# Patient Record
Sex: FEMALE | Race: WHITE | URBAN - METROPOLITAN AREA
[De-identification: names, ages, dates, MRNs, and addresses within clinical notes are randomized per-mention and may not be internally consistent; named-entity substitution may affect disease eponyms.]

---

## 2017-01-17 ENCOUNTER — HOSPITAL ENCOUNTER (EMERGENCY)
Facility: CLINIC | Age: 57
Discharge: HOME | End: 2017-01-18
Attending: EMERGENCY MEDICINE | Admitting: EMERGENCY MEDICINE
Payer: COMMERCIAL

## 2017-01-17 DIAGNOSIS — G44.219 EPISODIC TENSION-TYPE HEADACHE, NOT INTRACTABLE: ICD-10-CM

## 2017-01-17 LAB
ANION GAP SERPL CALCULATED.3IONS-SCNC: 11 MMOL/L (ref 3–14)
BASOPHILS # BLD AUTO: 0.1 10E9/L (ref 0–0.2)
BASOPHILS NFR BLD AUTO: 1.2 %
BUN SERPL-MCNC: 14 MG/DL (ref 7–30)
CALCIUM SERPL-MCNC: 9.3 MG/DL (ref 8.5–10.1)
CHLORIDE SERPL-SCNC: 102 MMOL/L (ref 94–109)
CO2 SERPL-SCNC: 27 MMOL/L (ref 20–32)
CREAT SERPL-MCNC: 0.91 MG/DL (ref 0.52–1.04)
DIFFERENTIAL METHOD BLD: NORMAL
EOSINOPHIL # BLD AUTO: 0.1 10E9/L (ref 0–0.7)
EOSINOPHIL NFR BLD AUTO: 2.1 %
ERYTHROCYTE [DISTWIDTH] IN BLOOD BY AUTOMATED COUNT: 11.4 % (ref 10–15)
GFR SERPL CREATININE-BSD FRML MDRD: 64 ML/MIN/1.7M2
GLUCOSE SERPL-MCNC: 112 MG/DL (ref 70–99)
HCT VFR BLD AUTO: 39.2 % (ref 35–47)
HGB BLD-MCNC: 13.7 G/DL (ref 11.7–15.7)
IMM GRANULOCYTES # BLD: 0 10E9/L (ref 0–0.4)
IMM GRANULOCYTES NFR BLD: 0.2 %
LYMPHOCYTES # BLD AUTO: 2.2 10E9/L (ref 0.8–5.3)
LYMPHOCYTES NFR BLD AUTO: 44.1 %
MCH RBC QN AUTO: 30.4 PG (ref 26.5–33)
MCHC RBC AUTO-ENTMCNC: 34.9 G/DL (ref 31.5–36.5)
MCV RBC AUTO: 87 FL (ref 78–100)
MONOCYTES # BLD AUTO: 0.4 10E9/L (ref 0–1.3)
MONOCYTES NFR BLD AUTO: 7.6 %
NEUTROPHILS # BLD AUTO: 2.2 10E9/L (ref 1.6–8.3)
NEUTROPHILS NFR BLD AUTO: 44.8 %
NRBC # BLD AUTO: 0 10*3/UL
NRBC BLD AUTO-RTO: 0 /100
PLATELET # BLD AUTO: 208 10E9/L (ref 150–450)
POTASSIUM SERPL-SCNC: 3.1 MMOL/L (ref 3.4–5.3)
RBC # BLD AUTO: 4.5 10E12/L (ref 3.8–5.2)
SODIUM SERPL-SCNC: 140 MMOL/L (ref 133–144)
TROPONIN I SERPL-MCNC: NORMAL UG/L (ref 0–0.04)
WBC # BLD AUTO: 4.9 10E9/L (ref 4–11)

## 2017-01-17 PROCEDURE — 80048 BASIC METABOLIC PNL TOTAL CA: CPT | Performed by: EMERGENCY MEDICINE

## 2017-01-17 PROCEDURE — 96361 HYDRATE IV INFUSION ADD-ON: CPT

## 2017-01-17 PROCEDURE — 25000128 H RX IP 250 OP 636: Performed by: EMERGENCY MEDICINE

## 2017-01-17 PROCEDURE — 93005 ELECTROCARDIOGRAM TRACING: CPT

## 2017-01-17 PROCEDURE — 85025 COMPLETE CBC W/AUTO DIFF WBC: CPT | Performed by: EMERGENCY MEDICINE

## 2017-01-17 PROCEDURE — 84484 ASSAY OF TROPONIN QUANT: CPT | Performed by: EMERGENCY MEDICINE

## 2017-01-17 PROCEDURE — 96375 TX/PRO/DX INJ NEW DRUG ADDON: CPT

## 2017-01-17 PROCEDURE — 99285 EMERGENCY DEPT VISIT HI MDM: CPT | Mod: 25

## 2017-01-17 PROCEDURE — 96374 THER/PROPH/DIAG INJ IV PUSH: CPT

## 2017-01-17 PROCEDURE — 25000125 ZZHC RX 250: Performed by: EMERGENCY MEDICINE

## 2017-01-17 RX ORDER — METOCLOPRAMIDE HYDROCHLORIDE 5 MG/ML
10 INJECTION INTRAMUSCULAR; INTRAVENOUS ONCE
Status: COMPLETED | OUTPATIENT
Start: 2017-01-17 | End: 2017-01-17

## 2017-01-17 RX ORDER — DIPHENHYDRAMINE HYDROCHLORIDE 50 MG/ML
25 INJECTION INTRAMUSCULAR; INTRAVENOUS ONCE
Status: COMPLETED | OUTPATIENT
Start: 2017-01-17 | End: 2017-01-17

## 2017-01-17 RX ADMIN — SODIUM CHLORIDE 1000 ML: 9 INJECTION, SOLUTION INTRAVENOUS at 22:57

## 2017-01-17 RX ADMIN — DIPHENHYDRAMINE HYDROCHLORIDE 25 MG: 50 INJECTION, SOLUTION INTRAMUSCULAR; INTRAVENOUS at 22:57

## 2017-01-17 RX ADMIN — METOCLOPRAMIDE 10 MG: 5 INJECTION, SOLUTION INTRAMUSCULAR; INTRAVENOUS at 22:57

## 2017-01-17 ASSESSMENT — ENCOUNTER SYMPTOMS
RHINORRHEA: 0
VOMITING: 0
WEAKNESS: 1
PHOTOPHOBIA: 1
NUMBNESS: 1
NAUSEA: 1
COUGH: 0

## 2017-01-17 NOTE — ED AVS SNAPSHOT
[facility] Emergency Department    [address] [place] [address]    Phone:  [phone]    Fax:  [phone]                                       Herminia Jacobo   MRN: [id number]    Department:  [facility] Emergency Department   Date of Visit:  1/17/2017           Patient Information     Date Of Birth          1960        Your diagnoses for this visit were:     Episodic tension-type headache, not intractable        You were seen by Christian Flores MD.      Follow-up Information     Follow up with Emely Holliday Schedule an appointment as soon as possible for a visit in 3 days.    Contact information:    [facility]  [address]   [place] [address] [phone]          Follow up with [facility] Emergency Department.    Specialty:  EMERGENCY MEDICINE    Why:  As needed, If symptoms worsen    Contact information:    [address] [place] [id number] [phone]      Discharge References/Attachments     HEADACHE SYMPTOMS, MANAGING TENSION-TYPE (ENGLISH)      24 Hour Appointment Hotline       To make an appointment at any [place] clinic, call [phone] ([phone]). If you don't have a family doctor or clinic, we will help you find one. [place] clinics are conveniently located to serve the needs of you and your family.             Review of your medicines      Our records show that you are taking the medicines listed below. If these are incorrect, please call your family doctor or clinic.        Dose / Directions Last dose taken    calcium carbonate 1250 (500 CA) MG/5ML Susp suspension   Dose:  2500 mg   Quantity:  700 mL        Take 10 mLs (2,500 mg) by mouth every 8 hours   Refills:  0        fluconazole 150 MG tablet   Commonly known as:  DIFLUCAN   Dose:  150 mg   Indication:  Candidiasis Prophylaxis   Quantity:  4 tablet        Take 1 tablet (150 mg) by mouth daily   Refills:  0         hydrochlorothiazide 12.5 MG capsule   Commonly known as:  MICROZIDE   Dose:  12.5 mg   Quantity:  90 capsule        Take 1 capsule (12.5 mg) by mouth every morning ONE DAILY IN THE MORNING   Refills:  3        HYDROcodone-acetaminophen 5-325 MG per tablet   Commonly known as:  NORCO   Dose:  1-2 tablet   Quantity:  30 tablet        Take 1-2 tablets by mouth every 4 hours as needed   Refills:  0        levothyroxine 75 MCG tablet   Commonly known as:  SYNTHROID/LEVOTHROID   Dose:  150 mcg   Quantity:  40 tablet        Take 2 tablets (150 mcg) by mouth daily   Refills:  0        lidocaine (Anorectal) 5 % Crea   Quantity:  1        as directed   Refills:  3        sertraline 50 MG tablet   Commonly known as:  ZOLOFT   Dose:  100 mg        Take 100 mg by mouth daily Visit due in Sept for future refills   Refills:  0        traZODone 100 MG tablet   Commonly known as:  DESYREL   Dose:  200 mg   Quantity:  180 tablet        Take 2 tablets (200 mg) by mouth nightly as needed for sleep Due for appt for future refills   Refills:  0        VALTREX 500 MG tablet   Dose:  500 mg   Generic drug:  valACYclovir        Take 500 mg by mouth 2 times daily as needed   Refills:  0        VITAMIN D PO        4000 Units daily   Refills:  0                Procedures and tests performed during your visit     Basic metabolic panel    CBC with platelets differential    Chest XR,  PA & LAT    EKG 12 lead    Head CT w/o contrast    Troponin I      Orders Needing Specimen Collection     None      Pending Results     Date and Time Order Name Status Description    1/17/2017 2244 Chest XR,  PA & LAT Preliminary     1/17/2017 2244 EKG 12 lead Preliminary             Pending Culture Results     No orders found for last 2 day(s).       Test Results from your hospital stay           1/18/2017 12:23 AM - Interface, Radiant Ib      Narrative     CT HEAD W/O CONTRAST  1/18/2017 12:15 AM      HISTORY: Worst headache of life.    TECHNIQUE: Routine  noncontrast head CT. Radiation dose for this scan  was reduced using automated exposure control, adjustment of the mA  and/or kV according to patient size, or iterative reconstruction  technique.    COMPARISON: None.    FINDINGS: Brain volume is normal for age. No mass, mass effect or  intracranial hemorrhage. No evidence of acute infarct. The visualized  paranasal sinuses are clear.        Impression     IMPRESSION: No acute abnormality.    JADA GREGORY MD         1/17/2017 11:01 PM - Interface, Flexilab Results      Component Results     Component Value Ref Range & Units Status    WBC 4.9 4.0 - 11.0 10e9/L Final    RBC Count 4.50 3.8 - 5.2 10e12/L Final    Hemoglobin 13.7 11.7 - 15.7 g/dL Final    Hematocrit 39.2 35.0 - 47.0 % Final    MCV 87 78 - 100 fl Final    MCH 30.4 26.5 - 33.0 pg Final    MCHC 34.9 31.5 - 36.5 g/dL Final    RDW 11.4 10.0 - 15.0 % Final    Platelet Count 208 150 - 450 10e9/L Final    Diff Method Automated Method  Final    % Neutrophils 44.8 % Final    % Lymphocytes 44.1 % Final    % Monocytes 7.6 % Final    % Eosinophils 2.1 % Final    % Basophils 1.2 % Final    % Immature Granulocytes 0.2 % Final    Nucleated RBCs 0 0 /100 Final    Absolute Neutrophil 2.2 1.6 - 8.3 10e9/L Final    Absolute Lymphocytes 2.2 0.8 - 5.3 10e9/L Final    Absolute Monocytes 0.4 0.0 - 1.3 10e9/L Final    Absolute Eosinophils 0.1 0.0 - 0.7 10e9/L Final    Absolute Basophils 0.1 0.0 - 0.2 10e9/L Final    Abs Immature Granulocytes 0.0 0 - 0.4 10e9/L Final    Absolute Nucleated RBC 0.0  Final         1/17/2017 11:19 PM - Interface, Flexilab Results      Component Results     Component Value Ref Range & Units Status    Sodium 140 133 - 144 mmol/L Final    Potassium 3.1 (L) 3.4 - 5.3 mmol/L Final    Chloride 102 94 - 109 mmol/L Final    Carbon Dioxide 27 20 - 32 mmol/L Final    Anion Gap 11 3 - 14 mmol/L Final    Glucose 112 (H) 70 - 99 mg/dL Final    Urea Nitrogen 14 7 - 30 mg/dL Final    Creatinine 0.91 0.52 - 1.04  mg/dL Final    GFR Estimate 64 >60 mL/min/1.7m2 Final    Non  GFR Calc    GFR Estimate If Black 77 >60 mL/min/1.7m2 Final    African American GFR Calc    Calcium 9.3 8.5 - 10.1 mg/dL Final         1/17/2017 11:19 PM - Interface, Flexilab Results      Component Results     Component Value Ref Range & Units Status    Troponin I ES  0.000 - 0.045 ug/L Final    <0.015  The 99th percentile for upper reference range is 0.045 ug/L.  Troponin values in   the range of 0.045 - 0.120 ug/L may be associated with risks of adverse   clinical events.           1/18/2017 12:45 AM - Interface, Radiant Ib      Narrative     XR CHEST 2 VW  1/18/2017 12:22 AM      HISTORY: Chest pain/palpitations.     COMPARISON: 4/19/2010.    FINDINGS: The heart size is normal. The lungs are clear. No  pneumothorax or pleural effusion.        Impression     IMPRESSION: No acute abnormality.                Clinical Quality Measure: Blood Pressure Screening     Your blood pressure was checked while you were in the emergency department today. The last reading we obtained was  BP: 112/77 mmHg . Please read the guidelines below about what these numbers mean and what you should do about them.  If your systolic blood pressure (the top number) is less than 120 and your diastolic blood pressure (the bottom number) is less than 80, then your blood pressure is normal. There is nothing more that you need to do about it.  If your systolic blood pressure (the top number) is 120-139 or your diastolic blood pressure (the bottom number) is 80-89, your blood pressure may be higher than it should be. You should have your blood pressure rechecked within a year by a primary care provider.  If your systolic blood pressure (the top number) is 140 or greater or your diastolic blood pressure (the bottom number) is 90 or greater, you may have high blood pressure. High blood pressure is treatable, but if left untreated over time it can put you at risk for heart  "attack, stroke, or kidney failure. You should have your blood pressure rechecked by a primary care provider within the next 4 weeks.  If your provider in the emergency department today gave you specific instructions to follow-up with your doctor or provider even sooner than that, you should follow that instruction and not wait for up to 4 weeks for your follow-up visit.        Thank you for choosing [facility]       Thank you for choosing [facility] for your care. Our goal is always to provide you with excellent care. Hearing back from our patients is one way we can continue to improve our services. Please take a few minutes to complete the written survey that you may receive in the mail after you visit with us. Thank you!        [facility] Information     "[employer] lets you send messages to your doctor, view your test results, renew your prescriptions, schedule appointments and more. To sign up, go to [email or web address] . Click on \"Log in\" on the left side of the screen, which will take you to the Welcome page. Then click on \"Sign up Now\" on the right side of the page.     You will be asked to enter the access code listed below, as well as some personal information. Please follow the directions to create your username and password.     Your access code is: [email or web address]  Expires: 2017 12:52 AM     Your access code will  in 90 days. If you need help or a new code, please call your [facility] clinic or [phone].        Care EveryWhere ID     This is your Care EveryWhere ID. This could be used by other organizations to access your [facility] medical records  [id number]        After Visit Summary       This is your record. Keep this with you and show to your community pharmacist(s) and doctor(s) at your next visit.                  "

## 2017-01-17 NOTE — ED AVS SNAPSHOT
[facility] Emergency Department    [address]    [facility] [address]    Phone:  [phone]    Fax:  [phone]                                       Herminia Jacobo   MRN: [id number]    Department:  [facility] Emergency Department   Date of Visit:  1/17/2017           After Visit Summary Signature Page     I have received my discharge instructions, and my questions have been answered. I have discussed any challenges I see with this plan with the nurse or doctor.    ..........................................................................................................................................  Patient/Patient Representative Signature      ..........................................................................................................................................  Patient Representative Print Name and Relationship to Patient    ..................................................               ................................................  Date                                            Time    ..........................................................................................................................................  Reviewed by Signature/Title    ...................................................              ..............................................  Date                                                            Time

## 2017-01-18 ENCOUNTER — APPOINTMENT (OUTPATIENT)
Dept: CT IMAGING | Facility: CLINIC | Age: 57
End: 2017-01-18
Attending: EMERGENCY MEDICINE
Payer: COMMERCIAL

## 2017-01-18 ENCOUNTER — APPOINTMENT (OUTPATIENT)
Dept: GENERAL RADIOLOGY | Facility: CLINIC | Age: 57
End: 2017-01-18
Attending: EMERGENCY MEDICINE
Payer: COMMERCIAL

## 2017-01-18 VITALS
BODY MASS INDEX: 21.97 KG/M2 | DIASTOLIC BLOOD PRESSURE: 71 MMHG | HEIGHT: 67 IN | TEMPERATURE: 99.8 F | WEIGHT: 140 LBS | RESPIRATION RATE: 16 BRPM | OXYGEN SATURATION: 98 % | SYSTOLIC BLOOD PRESSURE: 107 MMHG

## 2017-01-18 LAB — INTERPRETATION ECG - MUSE: NORMAL

## 2017-01-18 PROCEDURE — 96375 TX/PRO/DX INJ NEW DRUG ADDON: CPT

## 2017-01-18 PROCEDURE — 96361 HYDRATE IV INFUSION ADD-ON: CPT

## 2017-01-18 PROCEDURE — 25000125 ZZHC RX 250: Performed by: EMERGENCY MEDICINE

## 2017-01-18 PROCEDURE — 70450 CT HEAD/BRAIN W/O DYE: CPT

## 2017-01-18 PROCEDURE — 71020 XR CHEST 2 VW: CPT

## 2017-01-18 RX ORDER — KETOROLAC TROMETHAMINE 15 MG/ML
15 INJECTION, SOLUTION INTRAMUSCULAR; INTRAVENOUS ONCE
Status: COMPLETED | OUTPATIENT
Start: 2017-01-18 | End: 2017-01-18

## 2017-01-18 RX ADMIN — KETOROLAC TROMETHAMINE 15 MG: 15 INJECTION, SOLUTION INTRAMUSCULAR; INTRAVENOUS at 00:35

## 2017-01-18 NOTE — ED NOTES
A&Ox4. ABC's intact Pt c/o HA that started around 1500.  Took ibuprofen 600mg PTA.  Also c/o nausea.  States she has a heart murmur and was flying in a plane 1 week ago Monday.  Also has had neck pain for the past 4 days.

## 2017-01-18 NOTE — ED PROVIDER NOTES
"  History     Chief Complaint:  Headache    HPI   Herminia Jacobo is a 56 year old female who presents with a headache. At 1600 today while the patient was at work, she started experiencing a headache that became progressively worse. At the time of onset, the patient was experiencing arm numbness, hyperventilating, and nausea. Upon presentation to the ED, the patient started experiencing photophobia and leg weakness. The patient has also been experiencing chest pain for the last couple days. She has not been sleeping normally the last couple days. No vomiting or vision changes. No rhinorrhea. No cough. Today was not a stressful day for the patient. The patient does not get headaches often and has never had these symptoms in the past.     Allergies:  Penicillin   Sulfa drugs    Medications:    Microzide  Levothyroxine  Zoloft  Trazodone  Norco  Calcium carbonate  Diflucan  Valtrex  Vitamin D  Lidocaine cream     Past Medical History:    Hyperlipidemia  Hypothyroidism   Hypertension  Depressive disorder    Past Surgical History:    Endometrial ablation  Breast augmentation  Thyroidectomy    Family History:    Diabetes - father  CAD - father  Genetic disorder - mother  Thyroid disease - mother    Social History:  Marital Status: single  Presents to the ED a friend  Tobacco Use: former smoker  Alcohol Use: positive  PCP: Emely Holliday     Review of Systems   HENT: Negative for rhinorrhea.    Eyes: Positive for photophobia.   Respiratory: Negative for cough.         Positive for hyperventilation   Cardiovascular: Positive for chest pain.   Gastrointestinal: Positive for nausea. Negative for vomiting.   Neurological: Positive for weakness and numbness.   All other systems reviewed and are negative.      Physical Exam   First Vitals:  BP: 124/82 mmHg  Heart Rate: 68  Temp: 99.8  F (37.7  C)  Resp: 24  Height: 170.2 cm (5' 7\")  Weight: 63.504 kg (140 lb)  SpO2: 100 %    Physical Exam  Nursing note and vitals " reviewed.  Constitutional: Cooperative.   HENT:   Mouth/Throat: Moist mucous membranes.   Eyes: nonicteric sclera  Cardiovascular: Normal rate, regular rhythm, no murmurs, rubs, or gallops  Pulmonary/Chest: Effort normal and breath sounds normal. No respiratory distress. No wheezes. No rales.   Abdominal: Soft. Nontender, nondistended, no guarding or rigidity. BS present.   Musculoskeletal: Normal range of motion.   Neurological: Alert.  CN's II-XII intact. 5/5 UE and LE strength. PERRL    EOMI without nystagmus.      Sensation intact to light touch. Negative pronator drift.    Finger to nose intact.   Skin: Skin is warm and dry. No rash noted.   Psychiatric: Normal mood and affect.       Emergency Department Course   ECG:  @ 2252  Indication: headache  Vent. Rate 57 bpm. TX interval 176 ms. QRS duration 82 ms. QT/QTc 458/445 ms. P-R-T axis 63 57 59.   Sinus bradycardia. Low voltage QRS. Borderline ECG.    Read @ 2257 by Dr. Flores.    Imaging:  Radiographic findings were communicated with the patient who voiced understanding of the findings.    Chest XR, PA & LAT  No acute abnormality.  Preliminary radiology read.      Head CT w/o contrast  No acute abnormality.  Report per radiology.    Laboratory:  CBC:  WBC 4.9, HGB 13.7, , otherwise WNL  BMP: potassium 3.1 (L), glucose 112 (H), otherwise WNL (Creatinine 0.91)  (2153) Troponin: <0.015  Interventions:  (0034) Normal Saline, 1 liter, IV bolus  (2257) Reglan, 10 mg, IV injection  (2257) Benadryl, 25 mg, IV injection   (0035) Toradol, 15 mg, IV injection    Emergency Department Course:  Nursing notes and vitals reviewed.  I performed an exam of the patient as documented above.   EKG was done, interpretation as above.  A peripheral IV was established. Blood was drawn from the patient. This was sent for laboratory testing, findings above.   The patient was sent for a chest x-ray and head CT while in the emergency department, findings above.   Findings and  plan explained to the patient. Patient discharged home with instructions regarding supportive care, medications, and reasons to return. The importance of close follow-up was reviewed.   I personally reviewed the laboratory results with the patient and answered all related questions prior to discharge.     Impression & Plan    Medical Decision Making:  Patient presents with complaint of a headache. On physical exam, she has a normal exam without concerning signs or symptoms for any acute intracranial process. However, given patient s statement that this is the worst headache that she has ever had, I did feel it necessary to rule out patient for subarachnoid hemorrhage. Fortunately head CT is normal. No indication for LP as SAH is ruled out with negative head CT within first 6 hours of symptoms. Patient s pain is completely relieved after a migraine cocktail. I am most suspicious that this represents a tension headache. After discussion with patient and her , it sounds like there has been some ongoing stress at home, which did include also having no water at home for the last couple days. We discussed some stress relief techniques, including massage and trying to focus on hobbies. At time of discharge, patient was feeling better and wanting to go home. all of her questions were answered. Indications to return to the emergency department as well as follow up were discussed. Her and her  are in agreement with the plan.    Diagnosis:    ICD-10-CM    1. Episodic tension-type headache, not intractable G44.219        Disposition:  Discharge to home    I, Rc Nicole, am serving as a scribe on 1/17/2017 at 9:55 PM to personally document services performed by Dr. Flores based on my observations and the provider's statements to me.       Christian Flores MD  01/22/17 2224

## 2017-08-06 ENCOUNTER — HOSPITAL ENCOUNTER (EMERGENCY)
Facility: CLINIC | Age: 57
Discharge: HOME | End: 2017-08-06
Attending: EMERGENCY MEDICINE | Admitting: EMERGENCY MEDICINE
Payer: COMMERCIAL

## 2017-08-06 VITALS
SYSTOLIC BLOOD PRESSURE: 114 MMHG | HEART RATE: 90 BPM | DIASTOLIC BLOOD PRESSURE: 82 MMHG | WEIGHT: 153 LBS | OXYGEN SATURATION: 98 % | HEIGHT: 67 IN | TEMPERATURE: 97.6 F | BODY MASS INDEX: 24.01 KG/M2 | RESPIRATION RATE: 17 BRPM

## 2017-08-06 DIAGNOSIS — E89.0 STATUS POST THYROIDECTOMY: ICD-10-CM

## 2017-08-06 DIAGNOSIS — I47.10 PAROXYSMAL SUPRAVENTRICULAR TACHYCARDIA (H): ICD-10-CM

## 2017-08-06 DIAGNOSIS — E87.6 HYPOPOTASSEMIA: ICD-10-CM

## 2017-08-06 LAB
ALBUMIN UR-MCNC: NEGATIVE MG/DL
ANION GAP SERPL CALCULATED.3IONS-SCNC: 9 MMOL/L (ref 3–14)
APPEARANCE UR: CLEAR
BASOPHILS # BLD AUTO: 0 10E9/L (ref 0–0.2)
BASOPHILS NFR BLD AUTO: 0.4 %
BILIRUB UR QL STRIP: NEGATIVE
BUN SERPL-MCNC: 14 MG/DL (ref 7–30)
CA-I BLD-MCNC: 4.7 MG/DL (ref 4.4–5.2)
CALCIUM SERPL-MCNC: 9.3 MG/DL (ref 8.5–10.1)
CHLORIDE SERPL-SCNC: 111 MMOL/L (ref 94–109)
CO2 SERPL-SCNC: 24 MMOL/L (ref 20–32)
COLOR UR AUTO: ABNORMAL
CREAT SERPL-MCNC: 0.94 MG/DL (ref 0.52–1.04)
DIFFERENTIAL METHOD BLD: NORMAL
EOSINOPHIL # BLD AUTO: 0.1 10E9/L (ref 0–0.7)
EOSINOPHIL NFR BLD AUTO: 1.3 %
ERYTHROCYTE [DISTWIDTH] IN BLOOD BY AUTOMATED COUNT: 12.3 % (ref 10–15)
GFR SERPL CREATININE-BSD FRML MDRD: 61 ML/MIN/1.7M2
GLUCOSE SERPL-MCNC: 98 MG/DL (ref 70–99)
GLUCOSE UR STRIP-MCNC: NEGATIVE MG/DL
HCT VFR BLD AUTO: 41.7 % (ref 35–47)
HGB BLD-MCNC: 14.6 G/DL (ref 11.7–15.7)
HGB UR QL STRIP: NEGATIVE
IMM GRANULOCYTES # BLD: 0 10E9/L (ref 0–0.4)
IMM GRANULOCYTES NFR BLD: 0.2 %
KETONES UR STRIP-MCNC: NEGATIVE MG/DL
LEUKOCYTE ESTERASE UR QL STRIP: ABNORMAL
LYMPHOCYTES # BLD AUTO: 1.5 10E9/L (ref 0.8–5.3)
LYMPHOCYTES NFR BLD AUTO: 26.5 %
MAGNESIUM SERPL-MCNC: 2.1 MG/DL (ref 1.6–2.3)
MCH RBC QN AUTO: 31.7 PG (ref 26.5–33)
MCHC RBC AUTO-ENTMCNC: 35 G/DL (ref 31.5–36.5)
MCV RBC AUTO: 91 FL (ref 78–100)
MONOCYTES # BLD AUTO: 0.4 10E9/L (ref 0–1.3)
MONOCYTES NFR BLD AUTO: 6.7 %
MUCOUS THREADS #/AREA URNS LPF: PRESENT /LPF
NEUTROPHILS # BLD AUTO: 3.6 10E9/L (ref 1.6–8.3)
NEUTROPHILS NFR BLD AUTO: 64.9 %
NITRATE UR QL: NEGATIVE
NRBC # BLD AUTO: 0 10*3/UL
NRBC BLD AUTO-RTO: 0 /100
PH UR STRIP: 7.5 PH (ref 5–7)
PLATELET # BLD AUTO: 228 10E9/L (ref 150–450)
POTASSIUM SERPL-SCNC: 3.2 MMOL/L (ref 3.4–5.3)
RBC # BLD AUTO: 4.6 10E12/L (ref 3.8–5.2)
RBC #/AREA URNS AUTO: <1 /HPF (ref 0–2)
SODIUM SERPL-SCNC: 144 MMOL/L (ref 133–144)
SP GR UR STRIP: 1 (ref 1–1.03)
SQUAMOUS #/AREA URNS AUTO: <1 /HPF (ref 0–1)
TSH SERPL DL<=0.005 MIU/L-ACNC: 0.1 MU/L (ref 0.4–4)
URN SPEC COLLECT METH UR: ABNORMAL
UROBILINOGEN UR STRIP-MCNC: NORMAL MG/DL (ref 0–2)
WBC # BLD AUTO: 5.5 10E9/L (ref 4–11)
WBC #/AREA URNS AUTO: 1 /HPF (ref 0–2)

## 2017-08-06 PROCEDURE — 93010 ELECTROCARDIOGRAM REPORT: CPT | Mod: Z6 | Performed by: EMERGENCY MEDICINE

## 2017-08-06 PROCEDURE — 80048 BASIC METABOLIC PNL TOTAL CA: CPT | Performed by: EMERGENCY MEDICINE

## 2017-08-06 PROCEDURE — 82330 ASSAY OF CALCIUM: CPT | Performed by: EMERGENCY MEDICINE

## 2017-08-06 PROCEDURE — 93005 ELECTROCARDIOGRAM TRACING: CPT | Performed by: EMERGENCY MEDICINE

## 2017-08-06 PROCEDURE — 81001 URINALYSIS AUTO W/SCOPE: CPT | Performed by: EMERGENCY MEDICINE

## 2017-08-06 PROCEDURE — 25000132 ZZH RX MED GY IP 250 OP 250 PS 637: Performed by: EMERGENCY MEDICINE

## 2017-08-06 PROCEDURE — 99284 EMERGENCY DEPT VISIT MOD MDM: CPT | Performed by: EMERGENCY MEDICINE

## 2017-08-06 PROCEDURE — 83735 ASSAY OF MAGNESIUM: CPT | Performed by: EMERGENCY MEDICINE

## 2017-08-06 PROCEDURE — 99284 EMERGENCY DEPT VISIT MOD MDM: CPT | Mod: 25 | Performed by: EMERGENCY MEDICINE

## 2017-08-06 PROCEDURE — 84443 ASSAY THYROID STIM HORMONE: CPT | Performed by: EMERGENCY MEDICINE

## 2017-08-06 PROCEDURE — 85025 COMPLETE CBC W/AUTO DIFF WBC: CPT | Performed by: EMERGENCY MEDICINE

## 2017-08-06 RX ORDER — POTASSIUM CHLORIDE 20MEQ/15ML
40 LIQUID (ML) ORAL ONCE
Status: COMPLETED | OUTPATIENT
Start: 2017-08-06 | End: 2017-08-06

## 2017-08-06 RX ADMIN — POTASSIUM CHLORIDE 40 MEQ: 20 SOLUTION ORAL at 20:07

## 2017-08-06 ASSESSMENT — ENCOUNTER SYMPTOMS
ABDOMINAL PAIN: 0
NAUSEA: 0
HEADACHES: 0
DIARRHEA: 0
FEVER: 0
PALPITATIONS: 1
DIAPHORESIS: 1
WOUND: 1
NUMBNESS: 1
SORE THROAT: 0
RHINORRHEA: 0
COUGH: 0
DYSURIA: 0
BACK PAIN: 0
VOMITING: 0
SHORTNESS OF BREATH: 1
CHILLS: 0

## 2017-08-06 NOTE — ED PROVIDER NOTES
"    [facility] EMERGENCY DEPARTMENT ([facility])  8/06/17   History     Chief Complaint   Patient presents with     Palpitations     HPI  Herminia Jacobo is a 56 year old female with a history of hypertension, hypothyroidism, autoimmune thyroiditis and s/p thyroidectomy who was seen at the [facility] Emergency Department on 1/17/2017 for headaches.  Patient presents to the Emergency Department today via EMS for evaluation of palpitations. Patient reports she was riding her horse at a show today when she started having a \"gurgling feeling\" in her throat before she started. After she was done with her routine, which takes approximately 5 minutes, she began feeling \"clammy\" and having an increased heart rate. It continued to get worse and her friend, who used to work at the [facility], told her to sit down, relax, and the friend called an ambulance. Patient reports she usually has these symptoms 1-2 times per month, but they usually resolve themselves within 10-15 minutes. However, the symptoms did not resolve today. Patient denies any chest pain during the palpitations. She does note thought shortness of breath and tingling in her hands. She had thought these symptoms though were due to having her thyroid removed. She denies any fevers or chills earlier this week. Patient also notes her left hand cramping, the left arm had the blood pressure cuff on it, while having her blood pressure read. Patient no longer is experiencing palpitation while in the Emergency Department. Patient had multiple vagal maneuvers preformed by EMS en route. Patient reports having a Holter monitor ordered; however, she has not used it yet to monitor her heart. Of note, patient's father has a history of atrial fibrillation.    Patient also notes being bitten by an insect on her left side ribs earlier this week, which caused bruising and a welt. She in unsure what type of insect bit her.    I have reviewed the Medications, " Allergies, Past Medical and Surgical History, and Social History in the [facility] system.    Past Medical History:   Diagnosis Date     Constipation      Hypertension      Thyroid disorder      Unspecified hypothyroidism        Past Surgical History:   Procedure Laterality Date     BREAST SURGERY  1998    augmentation.     SURGICAL HISTORY OF -       Endometrial ablation     THYROIDECTOMY  1/20/2014    Procedure: THYROIDECTOMY;  TOTAL THYROIDECTOMY WITH LARYNGEAL NERVE MONITORING;  Surgeon: Ana Baldwin MD;  Location: [facility] OR       Family History   Problem Relation Age of Onset     DIABETES Father      C.A.D. Father      Genetic Disorder Mother      goiter     Genetic Disorder Maternal Grandmother      goiter     Genetic Disorder Other      goiter cancer     Other - See Comments Mother      bleeding problem      Thyroid Disease Mother      Thyroid Disease Maternal Grandmother      Other - See Comments Mother      varicose veins        Social History   Substance Use Topics     Smoking status: Former Smoker     Years: 1.00     Quit date: 7/16/2012     Smokeless tobacco: Never Used      Comment: 1 pack weekly     Alcohol use Yes      Comment:  3 glasses wine a month       No current facility-administered medications for this encounter.      Current Outpatient Prescriptions   Medication     hydrochlorothiazide (MICROZIDE) 12.5 MG capsule     HYDROcodone-acetaminophen (NORCO) 5-325 MG per tablet     levothyroxine (SYNTHROID, LEVOTHROID) 75 MCG tablet     calcium carbonate 1250 MG/5ML SUSP     valACYclovir (VALTREX) 500 MG tablet     sertraline (ZOLOFT) 50 MG tablet     traZODone (DESYREL) 100 MG tablet     VITAMIN D OR     LIDOCAINE (ANORECTAL) 5 % EX CREA        Allergies   Allergen Reactions     No Clinical Screening - See Comments      Burns on skin from bleached sheets.     Penicillin G Nausea and Vomiting     Sulfa Drugs Nausea and Vomiting         Review of Systems   Constitutional: Positive for diaphoresis.  "Negative for chills and fever.   HENT: Negative for rhinorrhea and sore throat.    Respiratory: Positive for shortness of breath. Negative for cough.    Cardiovascular: Positive for palpitations. Negative for chest pain.   Gastrointestinal: Negative for abdominal pain, diarrhea, nausea and vomiting.   Genitourinary: Negative for dysuria.   Musculoskeletal: Negative for back pain.   Skin: Positive for wound (bug bite).   Neurological: Positive for numbness (bilateral hands). Negative for headaches.       Physical Exam   BP: (!) 134/106  Pulse: 90  Temp: 97.6  F (36.4  C)  Resp: 20  Height: 170.2 cm (5' 7\")  Weight: 69.4 kg (153 lb)  SpO2: 100 %  Physical Exam   Constitutional: She is oriented to person, place, and time. She appears well-developed and well-nourished. No distress.   HENT:   Head: Normocephalic and atraumatic.   Mouth/Throat: Oropharynx is clear and moist.   Eyes: Conjunctivae are normal. Pupils are equal, round, and reactive to light.   Neck: No thyromegaly present.   Cardiovascular: Normal rate, regular rhythm and normal heart sounds.    Pulmonary/Chest: Effort normal and breath sounds normal. No respiratory distress. She has no wheezes.   Abdominal: Soft.   Musculoskeletal: She exhibits no edema.   Neurological: She is alert and oriented to person, place, and time. No cranial nerve deficit.   Skin: Skin is warm and dry. No rash noted.   Psychiatric: She has a normal mood and affect. Her behavior is normal.       ED Course   6:58 PM  The patient was seen and examined by Endy Roberson MD in Room ED08.     ED Course     Procedures             EKG Interpretation:      Interpreted by Endy Roberson  Time reviewed: 1847  Symptoms at time of EKG: Palpitations - resolved   Rhythm: normal sinus   Rate: normal  Axis: normal  Ectopy: none  Conduction: normal  ST Segments/ T Waves: No ST-T wave changes  Q Waves: none  Comparison to prior: Unchanged    Clinical Impression: normal EKG      Labs Ordered and " Resulted from Time of ED Arrival Up to the Time of Departure from the ED - No data to display         Assessments & Plan (with Medical Decision Making)   56-year-old female with history of benign thyroid nodule status post thyroidectomy and partial parathyroidectomy who arrives today to the Emergency Department today due to concern of palpitations.  Upon arrival the patient is noted to be alert, she is afebrile and hemodynamically stable.  Stat EKG demonstrates the patient to currently be in a normal sinus rhythm without signs of strain, ischemic type pattern or dysrhythmia.  Arriving with the patient is an EMS cardiac tracing which does reveal a rapid narrow complex rhythm without discrete P waves; my concern at this time based on rate of 150 would be possible 2-1 flutter versus slow SVT.  By the patient s own description she has had intermittent although brief symptoms lasting quite some time.  She s never formally been diagnosed with an atrial dysrhythmia, but has reportedly been worked up as an outpatient with tentative plan for Holter monitoring.  Otherwise the patient is hemodynamically stable with no evidence of respiratory distress or systemic toxidrome at this time.  I would obtain electrolyte studies, urinalysis, thyroid and parathyroid function, with placement of the patient on cardiac monitoring.  Pending negative workup in the Emergency Department the patient states she is able to obtain Holter monitoring tomorrow which I think would be of benefit.  We will assist the patient with outpatient cardiology follow-up.  At this time, I do not believe the patient requires acute hospitalization.    Lab studies reveal slight hypokalemia which we have begun to replace in the ED.  We discussed ways to supplement at home and I have provided her a 1 week supply of KCl with plan for outpatient recheck with her PCP in 1-2 weeks.  Her TSH is low but she remains on supplemental thyroid replacement following  thyroidectomy.  No other clinical signs of severe thyroid dysfuncion.  Otherwise she has remained in sinus rhythm in the ED.  We discussed a trial of vagal maneuvers should symptoms return at home otherwise call/return to the ED.  She has her own Cardiologist she would prefer to follow up with and already has an order for a Holter monitor which she will call to  tomorrow.        This part of the medical record was transcribed by Onofre Ann, Medical Scribe, from a dictation done by Endy Roberson MD.       I have reviewed the nursing notes.    I have reviewed the findings, diagnosis, plan and need for follow up with the patient.    New Prescriptions    No medications on file       Final diagnoses:   Paroxysmal supraventricular tachycardia (H)     I, Onofre Ann, am serving as a trained medical scribe to document services personally performed by Endy Roberson MD, based on the provider's statements to me.   IEndy MD, was physically present and have reviewed and verified the accuracy of this note documented by Onofre Ann.    8/6/2017   [facility], [place], EMERGENCY DEPARTMENT     Endy Roberson MD  08/06/17 2018       Endy Roberson MD  08/07/17 0106

## 2017-08-06 NOTE — ED AVS SNAPSHOT
[facility], Emergency Department    [address] [address]    Phone:  [phone]                                       Herminia Jacobo   MRN: [id number]    Department:  [facility], Emergency Department   Date of Visit:  8/6/2017           Patient Information     Date Of Birth          1960        Your diagnoses for this visit were:     Paroxysmal supraventricular tachycardia (H)        You were seen by Endy Roberson MD.      Follow-up Information     Please follow up.    Why:  As needed, If symptoms worsen    Contact information:    Cardiology        Discharge Instructions         Understanding Supraventricular Tachycardia  Supraventricular tachycardia (SVT) is a type of abnormal heart rhythm that results in fast heartbeats. The heart normally beats 60 to 100 beats per minute while you are at rest and awake. With SVT, the heart beats more than 100 times a minute. It may even beat over 200 times a minute. This is caused by a problem in the electrical system of the heart. It can lessen the amount of blood pumped through the heart.  How the heart beats  A heartbeat is the rhythm of the heart as it contracts to squeeze blood through the body. It s caused by electrical signals in the heart. A beat starts when a special group of cells give off an electrical signal. These cells are in the sinoatrial (SA) node. The SA node is in the upper right chamber of your heart (atrium). The signal from the SA node travels down to the 2 lower chambers of your heart (ventricles). On the way, the signal goes through the atrioventricular (AV) node. This is a special group of cells between the atria and ventricles. From there, the signal travels to your left and right ventricles. As it travels, the signal tells nearby parts of your heart to contract. This causes your heart to pump in a coordinated way.  What is SVT?  When you have SVT, the signal to start your heartbeat doesn t come from the SA node.  Instead it comes from another part of the left or right atrium. Or it comes from the AV node. Some area outside the SA node begins to fire quickly, causing a rapid heartbeat of over 100 beats per minute or the electrical signals are caught up in an abnormal looping circuit. This shortens the time your ventricles have to fill. If your heartbeat is fast enough, your heart may be unable to pump enough blood forward to the rest of your body. The abnormal heartbeat may last for a few seconds to a few hours before your heart returns to its normal rhythm. Some SVT rhythms can last for days or weeks, or even become permanent.  Types of SVT  There are several types of SVT. They include:    Atrial fibrillation. This is most common type of SVT. The upper chambers of the heart quiver very fast instead of pumping due to disorganized electrical activity in the atria.    Atrial flutter. This type of SVT is a milder form of fibrillation. The upper chambers of the heart flutter instead of pumping normally.    Atrioventricular woodrow reentrant tachycardia. It occurs when you have two channels through the AV node, instead of just one. The signal goes down one channel and up the other.    Atrioventricular reciprocating tachycardia. With this condition, there is an extra connection of muscle between the atrium and the ventricle. This is known as an accessory pathway and can conduct electricity upwards and downwards. The signal goes down the AV node and back to the atrium through the accessory pathway. It then goes down the AV node again. In rare cases, this condition leads to an abnormal heart rhythm that causes sudden death. This is a congenital defect, which means you were born with it.    Atrial tachycardia. This is another common type of SVT. A small group of cells in the atria begin to fire abnormally and trigger the fast heartbeat.    Multifocal atrial tachycardia. Multiple groups of cells in your atria fire abnormally and trigger  a fast heartbeat.  What causes SVT?  Some types of SVT run in families. Some people have heart problems from birth that cause SVT. High blood pressure, heart failure, mitral valve disease, sleep apnea, thyroid problems, and heart attacks can cause SVT. Smoking, excess caffeine or alcohol, and some medicines can increase your risk of having SVT.  Symptoms of SVT  When SVT happens, you may feel no symptoms. Or you may have:    Fluttering feelings in your chest (palpitations)    A tight feeling or pain in your chest    A pulsing feeling in your neck    Dizziness    Shortness of breath    Tiredness    Fainting    Nausea  In very rare cases, SVT can cause sudden death.  Diagnosing SVT  Your primary healthcare provider may diagnose you. Or you may see a heart doctor (cardiologist). The doctor will ask about your health history. He or she will also give you a physical exam. You may also have tests. These help show what kind of SVT you have, and what may cause it. They also help check for other problems. The tests may include:    Electrocardiogram (ECG), to analyze the abnormal rhythm    Continuous heart monitors such as a holter monitor or an event recorder to watch your heart rhythm over a longer period in an attempt to catch the SVT rhythm    Blood tests, to look for various causes such as thyroid problems or electrolyte abnormalities    Chest X-ray, to check for lung problems and look at the size of your heart    Exercise stress test, to see how well your heart works under stress    Echocardiography, to check your heart structure and function    Electrophysiologic study (EPS), an invasive procedure using wires in the heart to check the heart's electrical signals and diagnose the SVT  Date Last Reviewed: 5/1/2016 2000-2017 [employer]. [address] [place] [address]. All rights reserved. This information is not intended as a substitute for professional medical care. Always follow your  healthcare professional's instructions.          24 Hour Appointment Hotline       To make an appointment at any [facility], call [phone] ([phone]). If you don't have a family doctor or clinic, we will help you find one. [facility] clinics are conveniently located to serve the needs of you and your family.          ED Discharge Orders     Follow up with Cardiology       You should receive a call from [facility], [facility] to schedule your follow up appointment.  If you do not hear from them within 3 business days call [phone] for help in scheduling your testing and follow up.                     Review of your medicines      Our records show that you are taking the medicines listed below. If these are incorrect, please call your family doctor or clinic.        Dose / Directions Last dose taken    calcium carbonate 1250 (500 CA) MG/5ML Susp suspension   Dose:  2500 mg   Quantity:  700 mL        Take 10 mLs (2,500 mg) by mouth every 8 hours   Refills:  0        hydrochlorothiazide 12.5 MG capsule   Commonly known as:  MICROZIDE   Dose:  12.5 mg   Quantity:  90 capsule        Take 1 capsule (12.5 mg) by mouth every morning ONE DAILY IN THE MORNING   Refills:  3        HYDROcodone-acetaminophen 5-325 MG per tablet   Commonly known as:  NORCO   Dose:  1-2 tablet   Quantity:  30 tablet        Take 1-2 tablets by mouth every 4 hours as needed   Refills:  0        levothyroxine 75 MCG tablet   Commonly known as:  SYNTHROID/LEVOTHROID   Dose:  150 mcg   Quantity:  40 tablet        Take 2 tablets (150 mcg) by mouth daily   Refills:  0        lidocaine (Anorectal) 5 % Crea   Quantity:  1        as directed   Refills:  3        sertraline 50 MG tablet   Commonly known as:  ZOLOFT   Dose:  100 mg        Take 100 mg by mouth daily Visit due in Sept for future refills   Refills:  0        traZODone 100 MG tablet   Commonly known as:  DESYREL   Dose:  200 mg   Quantity:  180  "tablet        Take 2 tablets (200 mg) by mouth nightly as needed for sleep Due for appt for future refills   Refills:  0        VALTREX 500 MG tablet   Dose:  500 mg   Generic drug:  valACYclovir        Take 500 mg by mouth 2 times daily as needed   Refills:  0        VITAMIN D PO        4000 Units daily   Refills:  0                Procedures and tests performed during your visit     Basic metabolic panel    CBC with platelets differential    Calcium ionized    EKG 12 lead    Magnesium    TSH    UA with Microscopic      Orders Needing Specimen Collection     None      Pending Results     Date and Time Order Name Status Description    8/6/2017 1847 EKG 12 lead Preliminary             Pending Culture Results     No orders found from 8/4/2017 to 8/7/2017.            Pending Results Instructions     If you had any lab results that were not finalized at the time of your Discharge, you can call the ED Lab Result RN at [phone]. You will be contacted by this team for any positive Lab results or changes in treatment. The nurses are available 7 days a week from 10A to 6:30P.  You can leave a message 24 hours per day and they will return your call.        Thank you for choosing [facility]       Thank you for choosing [facility] for your care. Our goal is always to provide you with excellent care. Hearing back from our patients is one way we can continue to improve our services. Please take a few minutes to complete the written survey that you may receive in the mail after you visit with us. Thank you!        [employer]ax Information     [employer] lets you send messages to your doctor, view your test results, renew your prescriptions, schedule appointments and more. To sign up, go to [email or web address] . Click on \"Log in\" on the left side of the screen, which will take you to the Welcome page. Then click on \"Sign up Now\" on the right side of the page.     You will be asked to enter the access code listed below, as well as some " personal information. Please follow the directions to create your username and password.     Your access code is: [email or web address]  Expires: 2017  8:17 PM     Your access code will  in 90 days. If you need help or a new code, please call your [place] clinic or [phone].        Care EveryWhere ID     This is your Care EveryWhere ID. This could be used by other organizations to access your [facility] medical records  [id number]        Equal Access to Services     [facility] Center[facility] : Carleen yateso Soarmando, waaxda luqadaha, qaybta kaalmada adelizzy, pancho bell . So [facility] [phone].    ATENCIÓN: Si habla español, tiene a gaytan disposición servicios gratuitos de asistencia lingüística. Llame al [phone].    We comply with applicable federal civil rights laws and [place] laws. We do not discriminate on the basis of race, color, national origin, age, disability sex, sexual orientation or gender identity.            After Visit Summary       This is your record. Keep this with you and show to your community pharmacist(s) and doctor(s) at your next visit.

## 2017-08-06 NOTE — ED NOTES
[place]  Developed palpitations and dizziness  SVT rate 150s per EMS  converted with vagal maneuvers to 90s

## 2017-08-06 NOTE — ED AVS SNAPSHOT
[facility], [place], Emergency Department    [address] [place] [id number]    Phone:  [phone]                                       Herminia Jacobo   MRN: [id number]    Department:  [facility], Emergency Department   Date of Visit:  8/6/2017           After Visit Summary Signature Page     I have received my discharge instructions, and my questions have been answered. I have discussed any challenges I see with this plan with the nurse or doctor.    ..........................................................................................................................................  Patient/Patient Representative Signature      ..........................................................................................................................................  Patient Representative Print Name and Relationship to Patient    ..................................................               ................................................  Date                                            Time    ..........................................................................................................................................  Reviewed by Signature/Title    ...................................................              ..............................................  Date                                                            Time

## 2017-08-07 LAB — INTERPRETATION ECG - MUSE: NORMAL

## 2017-08-07 NOTE — DISCHARGE INSTRUCTIONS
Understanding Supraventricular Tachycardia  Supraventricular tachycardia (SVT) is a type of abnormal heart rhythm that results in fast heartbeats. The heart normally beats 60 to 100 beats per minute while you are at rest and awake. With SVT, the heart beats more than 100 times a minute. It may even beat over 200 times a minute. This is caused by a problem in the electrical system of the heart. It can lessen the amount of blood pumped through the heart.  How the heart beats  A heartbeat is the rhythm of the heart as it contracts to squeeze blood through the body. It s caused by electrical signals in the heart. A beat starts when a special group of cells give off an electrical signal. These cells are in the sinoatrial (SA) node. The SA node is in the upper right chamber of your heart (atrium). The signal from the SA node travels down to the 2 lower chambers of your heart (ventricles). On the way, the signal goes through the atrioventricular (AV) node. This is a special group of cells between the atria and ventricles. From there, the signal travels to your left and right ventricles. As it travels, the signal tells nearby parts of your heart to contract. This causes your heart to pump in a coordinated way.  What is SVT?  When you have SVT, the signal to start your heartbeat doesn t come from the SA node. Instead it comes from another part of the left or right atrium. Or it comes from the AV node. Some area outside the SA node begins to fire quickly, causing a rapid heartbeat of over 100 beats per minute or the electrical signals are caught up in an abnormal looping circuit. This shortens the time your ventricles have to fill. If your heartbeat is fast enough, your heart may be unable to pump enough blood forward to the rest of your body. The abnormal heartbeat may last for a few seconds to a few hours before your heart returns to its normal rhythm. Some SVT rhythms can last for days or weeks, or even become  permanent.  Types of SVT  There are several types of SVT. They include:    Atrial fibrillation. This is most common type of SVT. The upper chambers of the heart quiver very fast instead of pumping due to disorganized electrical activity in the atria.    Atrial flutter. This type of SVT is a milder form of fibrillation. The upper chambers of the heart flutter instead of pumping normally.    Atrioventricular woodrow reentrant tachycardia. It occurs when you have two channels through the AV node, instead of just one. The signal goes down one channel and up the other.    Atrioventricular reciprocating tachycardia. With this condition, there is an extra connection of muscle between the atrium and the ventricle. This is known as an accessory pathway and can conduct electricity upwards and downwards. The signal goes down the AV node and back to the atrium through the accessory pathway. It then goes down the AV node again. In rare cases, this condition leads to an abnormal heart rhythm that causes sudden death. This is a congenital defect, which means you were born with it.    Atrial tachycardia. This is another common type of SVT. A small group of cells in the atria begin to fire abnormally and trigger the fast heartbeat.    Multifocal atrial tachycardia. Multiple groups of cells in your atria fire abnormally and trigger a fast heartbeat.  What causes SVT?  Some types of SVT run in families. Some people have heart problems from birth that cause SVT. High blood pressure, heart failure, mitral valve disease, sleep apnea, thyroid problems, and heart attacks can cause SVT. Smoking, excess caffeine or alcohol, and some medicines can increase your risk of having SVT.  Symptoms of SVT  When SVT happens, you may feel no symptoms. Or you may have:    Fluttering feelings in your chest (palpitations)    A tight feeling or pain in your chest    A pulsing feeling in your neck    Dizziness    Shortness of  breath    Tiredness    Fainting    Nausea  In very rare cases, SVT can cause sudden death.  Diagnosing SVT  Your primary healthcare provider may diagnose you. Or you may see a heart doctor (cardiologist). The doctor will ask about your health history. He or she will also give you a physical exam. You may also have tests. These help show what kind of SVT you have, and what may cause it. They also help check for other problems. The tests may include:    Electrocardiogram (ECG), to analyze the abnormal rhythm    Continuous heart monitors such as a holter monitor or an event recorder to watch your heart rhythm over a longer period in an attempt to catch the SVT rhythm    Blood tests, to look for various causes such as thyroid problems or electrolyte abnormalities    Chest X-ray, to check for lung problems and look at the size of your heart    Exercise stress test, to see how well your heart works under stress    Echocardiography, to check your heart structure and function    Electrophysiologic study (EPS), an invasive procedure using wires in the heart to check the heart's electrical signals and diagnose the SVT  Date Last Reviewed: 5/1/2016 2000-2017 [employer]. [address] [place] [address]. All rights reserved. This information is not intended as a substitute for professional medical care. Always follow your healthcare professional's instructions.

## 2017-10-06 ENCOUNTER — HOSPITAL ENCOUNTER (EMERGENCY)
Facility: CLINIC | Age: 57
Discharge: HOME | End: 2017-10-06
Attending: EMERGENCY MEDICINE | Admitting: EMERGENCY MEDICINE
Payer: COMMERCIAL

## 2017-10-06 ENCOUNTER — NURSE TRIAGE (OUTPATIENT)
Dept: NURSING | Facility: CLINIC | Age: 57
End: 2017-10-06

## 2017-10-06 VITALS
RESPIRATION RATE: 18 BRPM | SYSTOLIC BLOOD PRESSURE: 119 MMHG | DIASTOLIC BLOOD PRESSURE: 85 MMHG | HEART RATE: 59 BPM | OXYGEN SATURATION: 99 % | TEMPERATURE: 97.7 F
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
## 2017-10-06 PROCEDURE — 25000132 ZZH RX MED GY IP 250 OP 250 PS 637: Performed by: EMERGENCY MEDICINE

## 2017-10-06 PROCEDURE — 99283 EMERGENCY DEPT VISIT LOW MDM: CPT

## 2017-10-06 RX ORDER — DOXYCYCLINE HYCLATE 100 MG
100 TABLET ORAL ONCE
Status: COMPLETED | OUTPATIENT
Start: 2017-10-06 | End: 2017-10-06

## 2017-10-06 RX ORDER — DOXYCYCLINE 100 MG/1
100 CAPSULE ORAL 2 TIMES DAILY
Qty: 14 CAPSULE | Refills: 0 | Status: SHIPPED | OUTPATIENT
Start: 2017-10-06 | End: 2017-10-13

## 2017-10-06 RX ADMIN — DOXYCYCLINE HYCLATE 100 MG: 100 TABLET, FILM COATED ORAL at 20:05

## 2017-10-06 ASSESSMENT — ENCOUNTER SYMPTOMS: WOUND: 1

## 2017-10-06 NOTE — ED AVS SNAPSHOT
[facility] Emergency Department    [address]    [facility] [address]    Phone:  [phone]    Fax:  [phone]                                       Herminia Jacobo   MRN: [id number]    Department:  [facility] Emergency Department   Date of Visit:  10/6/2017           After Visit Summary Signature Page     I have received my discharge instructions, and my questions have been answered. I have discussed any challenges I see with this plan with the nurse or doctor.    ..........................................................................................................................................  Patient/Patient Representative Signature      ..........................................................................................................................................  Patient Representative Print Name and Relationship to Patient    ..................................................               ................................................  Date                                            Time    ..........................................................................................................................................  Reviewed by Signature/Title    ...................................................              ..............................................  Date                                                            Time

## 2017-10-06 NOTE — TELEPHONE ENCOUNTER
"Caller states she was bit on the arm by a sick feral cat that was in her yard as she tried to keep her dogs from attacking it; has a deep bite on her forearm; cat remains in her yard but \"is almost dead\"  Triage protocol reviewed  Advised to call authorities for animal control to get the cat  Home care reviewed  Advised to proceed to ED tonight    Reason for Disposition    [1] Any break in skin (e.g., cut, puncture or scratch) AND[2] dog, cat, or ferret at risk for RABIES (e.g., sick, stray, unprovoked bite, developing country)    Protocols used: ANIMAL BITE-ADULT-  Yesica Pedraza RN  FNA    "

## 2017-10-06 NOTE — ED AVS SNAPSHOT
[facility] Emergency Department    [address] [place] [address]    Phone:  [phone]    Fax:  [phone]                                       Herminia Jacobo   MRN: [id number]    Department:  [facility] Emergency Department   Date of Visit:  10/6/2017           Patient Information     Date Of Birth          1960        Your diagnoses for this visit were:     Cat bite, initial encounter        You were seen by Curtis Ro MD.      Follow-up Information     Follow up with Emely Holliday In 2 days.    Contact information:    [facility]  [address]   [place] [address]  [phone]          Discharge Instructions         Cat Bite    A cat bite can cause a wound deep enough to break the skin. In such cases, the wound is cleaned and then sometimes closed. If the wound is closed it is usually not closed completely. This is so that fluid can drain if the wound becomes infected. Often the wound is left open to heal. In addition to wound care, a tetanus shot may be given, if needed.  Home care    Wash your hands well with soap and warm water before and after caring for the wound. This helps lower the risk of infection.    Care for the wound as directed. If a dressing was applied to the wound, be sure to change it as directed.    If the wound bleeds, place a clean, soft cloth on the wound. Then firmly apply pressure until the bleeding stops. This may take up to 5 minutes. Do not release the pressure and look at the wound during this time.    Always get medical attention for cat bites on the hand. They are highly likely to become infected.    Most wounds heal within 10 days. But an infection can occur even with proper treatment. So be sure to check the wound daily for signs of infection (see below).    Antibiotics may be prescribed. These help prevent or treat infection. If you re given antibiotics, take them as directed. Also be sure to complete  the medicines.  Rabies prevention  Rabies is a virus that can be carried in certain animals. These can include domestic animals such as cats and dogs. Pets fully vaccinated against rabies (2 shots) are at very low risk of infection. But because human rabies is almost always fatal, any biting pet should be confined for 10 days as an extra precaution. In general, if there is a risk for rabies, the following steps may need to be taken:    If someone s pet cat has bitten you, it should be kept in a secure area for the next 10 days to watch for signs of illness. (If the pet owner won t allow this, contact your local animal control center.) If the cat becomes ill or dies during that time, contact your local animal control center at once so the animal may be tested for rabies. If the cat stays healthy for the next 10 days, there is no danger of rabies in the animal or you.    If a stray cat bit you, contact your local animal control center. They can give information on capture, quarantine, and animal rabies testing.    If you can t find the animal that bit you in the next 2 days, and if rabies exists in your area, you may need to receive the rabies vaccine series. Call your healthcare provider right away. Or return to the emergency department promptly.    All animal bites should be reported to the local animal control center. If you were not given a form to fill out, you can report this yourself.  Follow-up care  Follow up with your healthcare provider, or as directed.  When to seek medical advice  Call your healthcare provider right away if any of these occur:    Signs of infection:    Spreading redness or warmth from the wound    Increased pain or swelling    Fever of 100.4 F (38 C) or higher, or as directed by your healthcare provider    Colored fluid or pus draining from the wound    Enlarged lymph nodes above the area that was bitten, such as lymph nodes in the armpit if you were bitten on the hand or arm. This may be  a sign of cat-scratch disease (cat-scratch fever).    Signs of rabies infection:    Headache    Confusion    Strange behavior    Increased salivating or drooling    Seizure    Decreased ability to move any body part near the bite area    Bleeding that can't be stopped after 5 minutes of firm pressure  Date Last Reviewed: 3/23/2015    1418-6798 [employer]. [address] [place] [address]. All rights reserved. This information is not intended as a substitute for professional medical care. Always follow your healthcare professional's instructions.          24 Hour Appointment Hotline       To make an appointment at any [facility], call [phone] ([phone]). If you don't have a family doctor or clinic, we will help you find one. [place] [place] clinics are conveniently located to serve the needs of you and your family.             Review of your medicines      START taking        Dose / Directions Last dose taken    doxycycline Monohydrate 100 MG Caps   Dose:  100 mg   Quantity:  14 capsule        Take 1 capsule (100 mg) by mouth 2 times daily for 7 days   Refills:  0          Our records show that you are taking the medicines listed below. If these are incorrect, please call your family doctor or clinic.        Dose / Directions Last dose taken    calcium carbonate 1250 (500 CA) MG/5ML Susp suspension   Dose:  2500 mg   Quantity:  700 mL        Take 10 mLs (2,500 mg) by mouth every 8 hours   Refills:  0        hydrochlorothiazide 12.5 MG capsule   Commonly known as:  MICROZIDE   Dose:  12.5 mg   Quantity:  90 capsule        Take 1 capsule (12.5 mg) by mouth every morning ONE DAILY IN THE MORNING   Refills:  3        HYDROcodone-acetaminophen 5-325 MG per tablet   Commonly known as:  NORCO   Dose:  1-2 tablet   Quantity:  30 tablet        Take 1-2 tablets by mouth every 4 hours as needed   Refills:  0        levothyroxine 75 MCG tablet   Commonly known as:  SYNTHROID/LEVOTHROID   Dose:   150 mcg   Quantity:  40 tablet        Take 2 tablets (150 mcg) by mouth daily   Refills:  0        lidocaine (Anorectal) 5 % Crea   Quantity:  1        as directed   Refills:  3        sertraline 50 MG tablet   Commonly known as:  ZOLOFT   Dose:  100 mg        Take 100 mg by mouth daily Visit due in Sept for future refills   Refills:  0        traZODone 100 MG tablet   Commonly known as:  DESYREL   Dose:  200 mg   Quantity:  180 tablet        Take 2 tablets (200 mg) by mouth nightly as needed for sleep Due for appt for future refills   Refills:  0        VALTREX 500 MG tablet   Dose:  500 mg   Generic drug:  valACYclovir        Take 500 mg by mouth 2 times daily as needed   Refills:  0        VITAMIN D PO        4000 Units daily   Refills:  0                Prescriptions were sent or printed at these locations (1 Prescription)                   Other Prescriptions                Printed at Department/Unit printer (1 of 1)         doxycycline Monohydrate 100 MG CAPS                Orders Needing Specimen Collection     None      Pending Results     No orders found from 10/4/2017 to 10/7/2017.            Pending Culture Results     No orders found from 10/4/2017 to 10/7/2017.            Pending Results Instructions     If you had any lab results that were not finalized at the time of your Discharge, you can call the ED Lab Result RN at [phone]. You will be contacted by this team for any positive Lab results or changes in treatment. The nurses are available 7 days a week from 10A to 6:30P.  You can leave a message 24 hours per day and they will return your call.        Test Results From Your Hospital Stay               Clinical Quality Measure: Blood Pressure Screening     Your blood pressure was checked while you were in the emergency department today. The last reading we obtained was  BP: 119/85 . Please read the guidelines below about what these numbers mean and what you should do about them.  If your systolic  "blood pressure (the top number) is less than 120 and your diastolic blood pressure (the bottom number) is less than 80, then your blood pressure is normal. There is nothing more that you need to do about it.  If your systolic blood pressure (the top number) is 120-139 or your diastolic blood pressure (the bottom number) is 80-89, your blood pressure may be higher than it should be. You should have your blood pressure rechecked within a year by a primary care provider.  If your systolic blood pressure (the top number) is 140 or greater or your diastolic blood pressure (the bottom number) is 90 or greater, you may have high blood pressure. High blood pressure is treatable, but if left untreated over time it can put you at risk for heart attack, stroke, or kidney failure. You should have your blood pressure rechecked by a primary care provider within the next 4 weeks.  If your provider in the emergency department today gave you specific instructions to follow-up with your doctor or provider even sooner than that, you should follow that instruction and not wait for up to 4 weeks for your follow-up visit.        Thank you for choosing [facility]       Thank you for choosing [facility] for your care. Our goal is always to provide you with excellent care. Hearing back from our patients is one way we can continue to improve our services. Please take a few minutes to complete the written survey that you may receive in the mail after you visit with us. Thank you!        [employer] Information     [employer] lets you send messages to your doctor, view your test results, renew your prescriptions, schedule appointments and more. To sign up, go to [email or web address] . Click on \"Log in\" on the left side of the screen, which will take you to the Welcome page. Then click on \"Sign up Now\" on the right side of the page.     You will be asked to enter the access code listed below, as well as some personal information. Please follow the " directions to create your username and password.     Your access code is: [email or web address]  Expires: 2017  8:17 PM     Your access code will  in 90 days. If you need help or a new code, please call your [place] [facility] clinic or [phone].        Care EveryWhere ID     This is your Care EveryWhere ID. This could be used by other organizations to access your [facility] medical records  [id number]        Equal Access to Services     EVIN JONES : Hadii charles william hadasho Soomaali, waaxda luqadaha, qaybta kaalmada adeegyada, pancho bell . So [facility] [phone].    ATENCIÓN: Si habla español, tiene a gaytan disposición servicios gratuitos de asistencia lingüística. Llame al [phone].    We comply with applicable federal civil rights laws and [place] laws. We do not discriminate on the basis of race, color, national origin, age, disability, sex, sexual orientation, or gender identity.            After Visit Summary       This is your record. Keep this with you and show to your community pharmacist(s) and doctor(s) at your next visit.

## 2017-10-07 NOTE — ED NOTES
ABCs intact. Pt picked up a feral cat and was bit on R upper arm. Pt states the cat  right after attack. Pt c/o R arm numbness.

## 2017-10-07 NOTE — DISCHARGE INSTRUCTIONS
Cat Bite    A cat bite can cause a wound deep enough to break the skin. In such cases, the wound is cleaned and then sometimes closed. If the wound is closed it is usually not closed completely. This is so that fluid can drain if the wound becomes infected. Often the wound is left open to heal. In addition to wound care, a tetanus shot may be given, if needed.  Home care    Wash your hands well with soap and warm water before and after caring for the wound. This helps lower the risk of infection.    Care for the wound as directed. If a dressing was applied to the wound, be sure to change it as directed.    If the wound bleeds, place a clean, soft cloth on the wound. Then firmly apply pressure until the bleeding stops. This may take up to 5 minutes. Do not release the pressure and look at the wound during this time.    Always get medical attention for cat bites on the hand. They are highly likely to become infected.    Most wounds heal within 10 days. But an infection can occur even with proper treatment. So be sure to check the wound daily for signs of infection (see below).    Antibiotics may be prescribed. These help prevent or treat infection. If you re given antibiotics, take them as directed. Also be sure to complete the medicines.  Rabies prevention  Rabies is a virus that can be carried in certain animals. These can include domestic animals such as cats and dogs. Pets fully vaccinated against rabies (2 shots) are at very low risk of infection. But because human rabies is almost always fatal, any biting pet should be confined for 10 days as an extra precaution. In general, if there is a risk for rabies, the following steps may need to be taken:    If someone s pet cat has bitten you, it should be kept in a secure area for the next 10 days to watch for signs of illness. (If the pet owner won t allow this, contact your local animal control center.) If the cat becomes ill or dies during that time, contact your  local animal control center at once so the animal may be tested for rabies. If the cat stays healthy for the next 10 days, there is no danger of rabies in the animal or you.    If a stray cat bit you, contact your local animal control center. They can give information on capture, quarantine, and animal rabies testing.    If you can t find the animal that bit you in the next 2 days, and if rabies exists in your area, you may need to receive the rabies vaccine series. Call your healthcare provider right away. Or return to the emergency department promptly.    All animal bites should be reported to the local animal control center. If you were not given a form to fill out, you can report this yourself.  Follow-up care  Follow up with your healthcare provider, or as directed.  When to seek medical advice  Call your healthcare provider right away if any of these occur:    Signs of infection:    Spreading redness or warmth from the wound    Increased pain or swelling    Fever of 100.4 F (38 C) or higher, or as directed by your healthcare provider    Colored fluid or pus draining from the wound    Enlarged lymph nodes above the area that was bitten, such as lymph nodes in the armpit if you were bitten on the hand or arm. This may be a sign of cat-scratch disease (cat-scratch fever).    Signs of rabies infection:    Headache    Confusion    Strange behavior    Increased salivating or drooling    Seizure    Decreased ability to move any body part near the bite area    Bleeding that can't be stopped after 5 minutes of firm pressure  Date Last Reviewed: 3/23/2015    9991-5839 [employer]. [address] [place] [address], [place] [address]. All rights reserved. This information is not intended as a substitute for professional medical care. Always follow your healthcare professional's instructions.

## 2017-10-07 NOTE — ED PROVIDER NOTES
History     Chief Complaint:    Cat Bite      HPI   Herminia Jacobo is a 57 year old female who is up to date with her tetanus shot, who presents to the ED today after a cat bite. The patient states that around 1700 today, she picked up a wild cat in order to keep it away from her dogs when it swung around and bit her upper right arm. She states that soon after the cat bit her, it . The patient reports that the cat looked sick. She also states that her arm is now a little numb and is concerned for infection or possibly rabies. She does state that she plans on bringing the cat to the vet on Monday to get it tested for rabies.       Allergies:  Penicillin G - nausea or vomiting   Sulfa drugs - nausea or vomiting      Medications:    Microzide   Norco   Levothyroxine   Valtrex   Zoloft   Desyrel     Past Medical History:    Hypertension   Thyroid disorder Hypothyroidism     Past Surgical History:    Breast surgery -   Endometrial ablation   Thyroidectomy -    Family History:    Diabetes - father   CAD - father   Goiter - mother   Thyroid disease - mother     Social History:  Marital Status:    Presents to the ED with boyfriend  Tobacco Use: former smoker  Alcohol Use: yes   PCP: Emely Holliday     Review of Systems   Skin: Positive for wound.   All other systems reviewed and are negative.      Physical Exam   First Vitals:  BP: 129/88  Pulse: 59  Temp: 97.7  F (36.5  C)  Resp: 18  SpO2: 99 %    Physical Exam  Constitutional:  Appears well-developed and well-nourished. Alert. Conversant. Non toxic.  HENT:   Head: Atraumatic.   Nose: Nose normal.  Mouth/Throat: Oral mucosa is clear and moist. no trismus. Pharynx normal. Tonsils symmetric. No tonsillar enlargement, erythema, or exudate.  Eyes: Conjunctivae normal. EOM normal. Pupils equal, round, and reactive to light. No scleral icterus.   Neck: Normal range of motion. Neck supple. No tracheal deviation present.   Cardiovascular: Normal rate,  regular rhythm.  Symmetric radial artery pulses   Abdominal: Soft. Bowel sounds normal. No distension. No mass. No tenderness. No rebound. No guarding.   Musculoskeletal: there is a small 1 - 2 cm area of ecchymosis on the right upper arm the lateral upper bicep. Directly adjacent to that there are 3 tiny (<1mm) red marks, and possibly a very small forth are likely fang marks from the Upper and lower teeth . No active bleeding. No visible foreign body. Normal range of motion. No edema. No tenderness. No deformity   Lymph: No erythema or ascending lymphangitis.   Neurological: Alert and oriented to person, place, and time. Normal strength. CN II-VII intact. No sensory deficit. GCS eye subscore is 4. GCS verbal subscore is 5. GCS motor subscore is 6. Normal coordination   Skin: Skin is warm and dry. No rash noted. No pallor. Normal capillary refill.  Psychiatric:  Normal mood. Normal affect.       Emergency Department Course   Interventions:  (2005) doxycycline 100 mg, PO    Emergency Department Course:  Nursing notes and vitals reviewed.  (1935) I performed an exam of the patient as documented above.    (2011) I consulted with the on-call  of epidemiology regarding the patient.   Findings and plan explained to the patient. Patient discharged home with instructions regarding supportive care, medications, and reasons to return. The importance of close follow-up was reviewed. The patient was prescribed doxycycline.    Impression & Plan    Medical Decision Making:  Herminia Jacobo is a 57 year old female who presents with her boyfriend for evaluation of a cat bite to her right upper arm. She picked up a feral cat this evening to protect it from her dogs when it hit her, through her coat sleeves into her right upper arm essentially on the lateral bicep. She came immediately to the ER to be evaluated for infection and get rabies treatment.    In terms of cat bite associated cellulitis, there is no evidence for  any infection yet, but were only a couple hours out from the incident. We discussed that with the tiny puncture wounds that she has she would be at risk for cellulitis. She wants to start on antibiotics as soon as possible. We discussed that there is in any infection at this time although there is perhaps as much as a 50% chance of developing one after a cat bite. She really wants to start antibiotics now. She has an allergy to penicillin than Augmentin. I think given the high risk for infection in this case it is reasonable to start a prophylactic antibiotic. We can start her on Doxycycline, which is 2nd line. 1st dose given in the ER. Nonetheless, she is still at risk for developing infection and a counselor that she will have to return for IV antibiotics if she does develop any redness swelling drainage fever or other concerns.    2nd concern is for rabies associated with this bite from a feral cat. The animal actually , apparently from illness, shortly after the bite. They have the animal carcass in their custody. We discussed that we could initiate PEP during this visit to the ER, although it would be safe to delay the rabies immunoglobulin injection and rabies vaccine for the next few days if they will bring the animal to the [place] [facility] for testing. We discussed that injection of the PEP, especially the RIG would require multiple subcutaneous injections with a fair amount of discomfort. Knowing that, the patient would prefer to wait. I contacted the [place]  and discuss case with them. The animal has not been refrigerated for the past 2 to 3 hours since it , but they still think it should be testable and if it is not testable, they will have the patient initiate PEP on Monday, still well within the safe window to prevent rabies. There instructed to keep the carcass children fridge temp, but not freeze it. They are to have plans to take the carcass to the [place]  state veterinary lab for testing and know where it is and are able to do so.     There eager for discharge from the ER.    Critical Care time:  none    Diagnosis:    ICD-10-CM    1. Cat bite, initial encounter W55.01XA        Disposition:  discharged to home    Discharge Medications:  New Prescriptions    DOXYCYCLINE MONOHYDRATE 100 MG CAPS    Take 1 capsule (100 mg) by mouth 2 times daily for 7 days         Meera GOEL am serving as a scribe on 10/6/2017 at 7:35 PM to personally document services performed by Dr. Ro based on my observations and the provider's statements to me.    10/6/2017   [facility] EMERGENCY DEPARTMENT       Curtis Ro MD  10/07/17 0025

## 2020-05-08 ENCOUNTER — HOSPITAL ENCOUNTER (EMERGENCY)
Facility: CLINIC | Age: 60
Discharge: HOME | End: 2020-05-08
Attending: EMERGENCY MEDICINE | Admitting: EMERGENCY MEDICINE
Payer: COMMERCIAL

## 2020-05-08 VITALS
SYSTOLIC BLOOD PRESSURE: 119 MMHG | OXYGEN SATURATION: 98 % | RESPIRATION RATE: 20 BRPM | HEART RATE: 61 BPM | DIASTOLIC BLOOD PRESSURE: 88 MMHG | TEMPERATURE: 98.5 F

## 2020-05-08 DIAGNOSIS — R11.0 NAUSEA: ICD-10-CM

## 2020-05-08 DIAGNOSIS — K59.03 DRUG-INDUCED CONSTIPATION: ICD-10-CM

## 2020-05-08 LAB
ALBUMIN SERPL-MCNC: 3.3 G/DL (ref 3.4–5)
ALP SERPL-CCNC: 71 U/L (ref 40–150)
ALT SERPL W P-5'-P-CCNC: 24 U/L (ref 0–50)
ANION GAP SERPL CALCULATED.3IONS-SCNC: 6 MMOL/L (ref 3–14)
AST SERPL W P-5'-P-CCNC: 31 U/L (ref 0–45)
BASOPHILS # BLD AUTO: 0 10E9/L (ref 0–0.2)
BASOPHILS NFR BLD AUTO: 0.8 %
BILIRUB SERPL-MCNC: 0.4 MG/DL (ref 0.2–1.3)
BUN SERPL-MCNC: 8 MG/DL (ref 7–30)
CALCIUM SERPL-MCNC: 8.4 MG/DL (ref 8.5–10.1)
CHLORIDE SERPL-SCNC: 107 MMOL/L (ref 94–109)
CO2 SERPL-SCNC: 27 MMOL/L (ref 20–32)
CREAT SERPL-MCNC: 1.18 MG/DL (ref 0.52–1.04)
DIFFERENTIAL METHOD BLD: NORMAL
EOSINOPHIL # BLD AUTO: 0.1 10E9/L (ref 0–0.7)
EOSINOPHIL NFR BLD AUTO: 1.6 %
ERYTHROCYTE [DISTWIDTH] IN BLOOD BY AUTOMATED COUNT: 11.5 % (ref 10–15)
GFR SERPL CREATININE-BSD FRML MDRD: 51 ML/MIN/{1.73_M2}
GLUCOSE SERPL-MCNC: 99 MG/DL (ref 70–99)
HCT VFR BLD AUTO: 42.5 % (ref 35–47)
HGB BLD-MCNC: 14.3 G/DL (ref 11.7–15.7)
IMM GRANULOCYTES # BLD: 0 10E9/L (ref 0–0.4)
IMM GRANULOCYTES NFR BLD: 0.2 %
LYMPHOCYTES # BLD AUTO: 1.3 10E9/L (ref 0.8–5.3)
LYMPHOCYTES NFR BLD AUTO: 25.3 %
MCH RBC QN AUTO: 31.5 PG (ref 26.5–33)
MCHC RBC AUTO-ENTMCNC: 33.6 G/DL (ref 31.5–36.5)
MCV RBC AUTO: 94 FL (ref 78–100)
MONOCYTES # BLD AUTO: 0.4 10E9/L (ref 0–1.3)
MONOCYTES NFR BLD AUTO: 8.7 %
NEUTROPHILS # BLD AUTO: 3.1 10E9/L (ref 1.6–8.3)
NEUTROPHILS NFR BLD AUTO: 63.4 %
NRBC # BLD AUTO: 0 10*3/UL
NRBC BLD AUTO-RTO: 0 /100
PLATELET # BLD AUTO: 204 10E9/L (ref 150–450)
POTASSIUM SERPL-SCNC: 3.5 MMOL/L (ref 3.4–5.3)
PROT SERPL-MCNC: 6.6 G/DL (ref 6.8–8.8)
RBC # BLD AUTO: 4.54 10E12/L (ref 3.8–5.2)
SODIUM SERPL-SCNC: 140 MMOL/L (ref 133–144)
TROPONIN I SERPL-MCNC: <0.015 UG/L (ref 0–0.04)
WBC # BLD AUTO: 4.9 10E9/L (ref 4–11)

## 2020-05-08 PROCEDURE — 96361 HYDRATE IV INFUSION ADD-ON: CPT

## 2020-05-08 PROCEDURE — 80053 COMPREHEN METABOLIC PANEL: CPT | Performed by: EMERGENCY MEDICINE

## 2020-05-08 PROCEDURE — 93005 ELECTROCARDIOGRAM TRACING: CPT

## 2020-05-08 PROCEDURE — 85025 COMPLETE CBC W/AUTO DIFF WBC: CPT | Performed by: EMERGENCY MEDICINE

## 2020-05-08 PROCEDURE — 96375 TX/PRO/DX INJ NEW DRUG ADDON: CPT

## 2020-05-08 PROCEDURE — 96374 THER/PROPH/DIAG INJ IV PUSH: CPT

## 2020-05-08 PROCEDURE — 25800030 ZZH RX IP 258 OP 636: Performed by: EMERGENCY MEDICINE

## 2020-05-08 PROCEDURE — 99285 EMERGENCY DEPT VISIT HI MDM: CPT | Mod: 25

## 2020-05-08 PROCEDURE — 25000128 H RX IP 250 OP 636: Performed by: EMERGENCY MEDICINE

## 2020-05-08 PROCEDURE — 84484 ASSAY OF TROPONIN QUANT: CPT | Performed by: EMERGENCY MEDICINE

## 2020-05-08 RX ORDER — ONDANSETRON 4 MG/1
4 TABLET, ORALLY DISINTEGRATING ORAL EVERY 6 HOURS PRN
Qty: 10 TABLET | Refills: 0 | Status: SHIPPED | OUTPATIENT
Start: 2020-05-08 | End: 2020-05-11

## 2020-05-08 RX ORDER — POLYETHYLENE GLYCOL 3350 17 G/17G
1 POWDER, FOR SOLUTION ORAL DAILY
Qty: 527 G | Refills: 0 | Status: SHIPPED | OUTPATIENT
Start: 2020-05-08 | End: 2020-05-15

## 2020-05-08 RX ORDER — ONDANSETRON 2 MG/ML
4 INJECTION INTRAMUSCULAR; INTRAVENOUS EVERY 30 MIN PRN
Status: DISCONTINUED | OUTPATIENT
Start: 2020-05-08 | End: 2020-05-08 | Stop reason: HOSPADM

## 2020-05-08 RX ORDER — LORAZEPAM 2 MG/ML
0.5 INJECTION INTRAMUSCULAR ONCE
Status: COMPLETED | OUTPATIENT
Start: 2020-05-08 | End: 2020-05-08

## 2020-05-08 RX ADMIN — LORAZEPAM 0.5 MG: 2 INJECTION, SOLUTION INTRAMUSCULAR; INTRAVENOUS at 17:05

## 2020-05-08 RX ADMIN — ONDANSETRON 4 MG: 2 INJECTION INTRAMUSCULAR; INTRAVENOUS at 17:05

## 2020-05-08 RX ADMIN — SODIUM CHLORIDE 1000 ML: 9 INJECTION, SOLUTION INTRAVENOUS at 17:04

## 2020-05-08 NOTE — DISCHARGE INSTRUCTIONS
Push fluids  Stay away from prescription pain meds to decrease constipation  Miralax for contipation  Gratiot diet until you feel improved

## 2020-05-08 NOTE — ED PROVIDER NOTES
History     Chief Complaint:  Nausea & Vomiting      HPI   Herminia VALENTE Jacobo is a 59 year old female who presents with nausea onset today with reported hyperventilation at home by EMS. Patient had bilateral bunion surgery 2 days ago by [facility] and had been taking norco at home for pain. She notes no issues with her feet since surgery but reports lack of BM since surgery and onset of nausea this afternoon. She did not vomit but felt she could. No fever, cough, dyspnea, abdominal pain, diarrhea. She has been drinking fluids and eating normally. She's not on a stool softener. No sick contacts.    Allergies:  Penicillin G  Sulfa Drugs    Medications:    Microzide  Norco  Levothyroxine  Sertraline  Trazodone  Valtrex  Vitamin D     Past Medical History:    HTN  Hypothyroidism  Insomnia  Depression    Past Surgical History:    Breast augmentation   Endometrial ablation  Thyroidectomy    Family History:    Diabetes - father  CAD - father  Goiter - mother  Thyroid disease - mother  Varicose veins - mother    Social History:  Smoking status: former smoker  Alcohol use: yes  Drug use: no  The patient presents to the emergency department via EMS.  PCP: Emely Holliday  Marital Status:       Review of Systems  Please see HPI. All other systems reviewed and negative.      Physical Exam     Patient Vitals for the past 24 hrs:   BP Temp Temp src Heart Rate Resp SpO2   05/08/20 1648 (!) 151/114 98.5  F (36.9  C) Oral 57 20 100 %       Physical Exam  Constitutional:       Appearance: She is well-developed.   HENT:      Right Ear: External ear normal.      Left Ear: External ear normal.      Mouth/Throat:      Mouth: Mucous membranes are moist.      Pharynx: Oropharynx is clear. No oropharyngeal exudate.   Eyes:      General: No scleral icterus.     Extraocular Movements: Extraocular movements intact.      Conjunctiva/sclera: Conjunctivae normal.      Pupils: Pupils are equal, round, and reactive to light.   Neck:       Musculoskeletal: Normal range of motion and neck supple.   Cardiovascular:      Rate and Rhythm: Normal rate and regular rhythm.      Heart sounds: Normal heart sounds. No murmur. No friction rub. No gallop.    Pulmonary:      Effort: Pulmonary effort is normal. No respiratory distress.      Breath sounds: Normal breath sounds. No wheezing or rales.   Abdominal:      General: Bowel sounds are normal. There is no distension.      Palpations: Abdomen is soft. There is no mass.      Tenderness: There is no abdominal tenderness.   Musculoskeletal: Normal range of motion.   Skin:     General: Skin is warm and dry.      Findings: No rash.   Neurological:      General: No focal deficit present.      Mental Status: She is alert.      Cranial Nerves: No cranial nerve deficit.   Psychiatric:      Comments: Anxious appearing           Emergency Department Course   ECG (17:02:10):  Rate 57 bpm. HI interval 160. QRS duration 78. QT/QTc 454/441. P-R-T axes 7 45. 52 Interpreted at Sinus bradycardia. Low voltage QRS. Borderline ECG. by Mayelin Perez MD.    Laboratory:  CBC: WBC: 4.9, HGB: 14.3, PLT: 204  CMP: Glucose 99, GFR 51 (L), Calcium 8.4 (L), Albumin 3.3 (L), Protein total 6.6 (L), o/w WNL (Creatinine: 1.18 (H))  1652 Troponin: <0.015    Interventions:  1704 NS 1000 mL IV  1705 Ativan 0.5 mg IV  1705 Zofran 4 mg IV    Emergency Department Course:  Nursing notes and vitals reviewed. (1700) I performed an exam of the patient as documented above.     IV inserted. Medicine administered as documented above. Blood drawn. This was sent to the lab for further testing, results above.     An EKG was recorded. Results as noted above.     1748 I rechecked the patient and discussed the results of her workup thus far.     Findings and plan explained to the Patient. Patient discharged home with instructions regarding supportive care, medications, and reasons to return. The importance of close follow-up was reviewed. The patient was  prescribed Zofran-odt and Miralax.    I personally reviewed the laboratory results with the Patient and answered all related questions prior to discharge.     Impression & Plan    Medical Decision Making:  Patient presents with above symptoms after bunion surgery. Most likely opioid induced constipation along with nausea. No clinical signs of bowel obstruction or life threatening emergency. Labs are reassuring. Symptoms are gone after fluids and zofran. She passed a PO challenge and is requesting to go home. I discussed with her the need to stop opioids or reduce the amount taken. She is sent home with zofran and miralax prescription. She is asked to keep pushing fluids and eat a bland diet. She voiced understanding of instructions.    Diagnosis:    ICD-10-CM    1. Nausea  R11.0    2. Drug-induced constipation  K59.03        Disposition:  discharged to home    Discharge Medications:  New Prescriptions    ONDANSETRON (ZOFRAN ODT) 4 MG ODT TAB    Take 1 tablet (4 mg) by mouth every 6 hours as needed    POLYETHYLENE GLYCOL (MIRALAX) 17 GM/SCOOP POWDER    Take 17 g (1 capful) by mouth daily for 7 days       Mayelin Perez MD  5/8/2020   [facility] EMERGENCY DEPARTMENT  Scribe Disclosure:  I, Holden Linda, am serving as a scribe at 5:00 PM on 5/8/2020 to document services personally performed by Mayelin Perez MD based on my observations and the provider's statements to me.        Mayelin Perez MD  05/08/20 [id number]

## 2020-05-08 NOTE — ED TRIAGE NOTES
Pt arrives via EMS with nausea and vomiting. Pt had bunion surgery on Wednesday and started to feel nauseous with some dry-heaving. Had a COVD test on Monday, negative. No reported fevers. Pt reports hyperventilation prior to EMS arrival.  A&O, VSS.

## 2020-05-08 NOTE — ED AVS SNAPSHOT
[facility] Emergency Department  [address]  [facility] [address]  Phone:  [phone]  Fax:  [phone]                                    Herminia Jacobo   MRN: [id number]    Department:  [facility] Emergency Department   Date of Visit:  5/8/2020           After Visit Summary Signature Page    I have received my discharge instructions, and my questions have been answered. I have discussed any challenges I see with this plan with the nurse or doctor.    ..........................................................................................................................................  Patient/Patient Representative Signature      ..........................................................................................................................................  Patient Representative Print Name and Relationship to Patient    ..................................................               ................................................  Date                                   Time    ..........................................................................................................................................  Reviewed by Signature/Title    ...................................................              ..............................................  Date                                               Time          22EPIC Rev 08/18

## 2020-05-11 LAB — INTERPRETATION ECG - MUSE: NORMAL

## 2022-10-05 ENCOUNTER — HOSPITAL ENCOUNTER (EMERGENCY)
Facility: CLINIC | Age: 62
Discharge: LEFT BEFORE DISCHARGE | End: 2022-10-05
Payer: COMMERCIAL